# Patient Record
Sex: FEMALE | Race: WHITE | NOT HISPANIC OR LATINO | Employment: UNEMPLOYED | URBAN - METROPOLITAN AREA
[De-identification: names, ages, dates, MRNs, and addresses within clinical notes are randomized per-mention and may not be internally consistent; named-entity substitution may affect disease eponyms.]

---

## 2017-03-23 ENCOUNTER — HOSPITAL ENCOUNTER (EMERGENCY)
Facility: OTHER | Age: 62
Discharge: HOME | End: 2017-03-23
Attending: EMERGENCY MEDICINE
Payer: MEDICAID

## 2017-03-23 VITALS
OXYGEN SATURATION: 100 % | TEMPERATURE: 99 F | WEIGHT: 146 LBS | SYSTOLIC BLOOD PRESSURE: 148 MMHG | RESPIRATION RATE: 18 BRPM | HEART RATE: 73 BPM | DIASTOLIC BLOOD PRESSURE: 60 MMHG

## 2017-03-23 DIAGNOSIS — M85.80 OSTEOPENIA: ICD-10-CM

## 2017-03-23 DIAGNOSIS — M19.012 OSTEOARTHRITIS OF LEFT SHOULDER, UNSPECIFIED OSTEOARTHRITIS TYPE: ICD-10-CM

## 2017-03-23 DIAGNOSIS — M25.512 ACUTE PAIN OF LEFT SHOULDER: Primary | ICD-10-CM

## 2017-03-23 PROCEDURE — 99283 EMERGENCY DEPT VISIT LOW MDM: CPT

## 2017-03-23 RX ORDER — HYDROCODONE BITARTRATE AND ACETAMINOPHEN 5; 325 MG/1; MG/1
1 TABLET ORAL EVERY 6 HOURS PRN
Qty: 12 TABLET | Refills: 0 | Status: SHIPPED | OUTPATIENT
Start: 2017-03-23

## 2017-03-23 RX ORDER — LISINOPRIL 40 MG/1
40 TABLET ORAL DAILY
COMMUNITY

## 2017-03-23 RX ORDER — IBUPROFEN 100 MG/5ML
1000 SUSPENSION, ORAL (FINAL DOSE FORM) ORAL DAILY
COMMUNITY

## 2017-03-23 RX ORDER — METOPROLOL TARTRATE 50 MG/1
50 TABLET ORAL 2 TIMES DAILY
COMMUNITY

## 2017-03-23 RX ORDER — HYDROCHLOROTHIAZIDE 12.5 MG/1
12.5 TABLET ORAL DAILY
COMMUNITY

## 2017-03-23 RX ORDER — AMLODIPINE BESYLATE 10 MG/1
10 TABLET ORAL DAILY
COMMUNITY

## 2017-03-23 NOTE — ED TRIAGE NOTES
"Pt reports fell at work "last May" 2016 injuring left arm pt reports unable to see Doctor due to no insurance / pt reports reported accident to work   "

## 2017-03-23 NOTE — ED NOTES
Shoulder Pain: Patient complaints of left shoulder pain. This is evaluated as a personal injury. The pain is described as sharp and shooting.  The onset of the pain was gradual, starting about a few weeks ago.  The pain occurs continuously and lasts 2 days.  Location is anterior, posterior. Recurrent, 5 episodes of dislocation. Symptoms are aggravated by reaching, pulling. Symptoms are diminished by  rest, heat, medication: ASA, Ibuprofen used but not effective.   Limited activities include: pushing, carrying, throwing. no swelling is reported. 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

## 2017-03-23 NOTE — ED AVS SNAPSHOT
[facility] EMERGENCY DEPARTMENT  [address] [facility] [address]               Lilly P Alyssa   3/23/2017 11:40 AM   ED    Description:  Female : 1955   Department:  [facility] Emergency Department           Your Care was Coordinated By:     Provider Role From To    Alejandrina Henning MD Attending Provider 17 1154 --      Reason for Visit     Arm Pain           Diagnoses this Visit        Comments    Acute pain of left shoulder    -  Primary     Osteopenia         Osteoarthritis of left shoulder, unspecified osteoarthritis type           ED Disposition     ED Disposition Condition Comment    Discharge  Lilly Shah discharge to home/self care.    - Condition at discharge: Stable  - Mode of Discharge: by walking out            To Do List           Follow-up Information     Schedule an appointment as soon as possible for a visit with MARLEN Villegas MD.    Specialty:  Orthopedic Surgery    Why:  for continued pain    Contact information:    [address] [facility] [address]  [phone]         These Medications        Disp Refills Start End    hydrocodone-acetaminophen 5-325mg (NORCO) 5-325 mg per tablet 12 tablet 0 3/23/2017     Take 1 tablet by mouth every 6 (six) hours as needed for Pain. - Oral      [facility]     [facility] -  Assistance  Registered nurses in the [facility] provide clinical advisement, health education, appointment booking, and other advisory services.  Call for this free service at [phone].             Medications           START taking these NEW medications        Refills    hydrocodone-acetaminophen 5-325mg (NORCO) 5-325 mg per tablet 0    Sig: Take 1 tablet by mouth every 6 (six) hours as needed for Pain.    Class: Print    Route: Oral           Verify that the below list of medications is an accurate representation of the medications you are currently taking.  If none reported, the list may be  blank. If incorrect, please contact your healthcare provider. Carry this list with you in case of emergency.           Current Medications     amlodipine (NORVASC) 10 MG tablet Take 10 mg by mouth once daily.    ascorbic acid, vitamin C, (VITAMIN C) 1000 MG tablet Take 1,000 mg by mouth once daily.    hydrochlorothiazide (HYDRODIURIL) 12.5 MG Tab Take 12.5 mg by mouth once daily.    lisinopril (PRINIVIL,ZESTRIL) 40 MG tablet Take 40 mg by mouth once daily.    metoprolol tartrate (LOPRESSOR) 50 MG tablet Take 50 mg by mouth 2 (two) times daily.    hydrocodone-acetaminophen 5-325mg (NORCO) 5-325 mg per tablet Take 1 tablet by mouth every 6 (six) hours as needed for Pain.           Clinical Reference Information           Your Vitals Were     BP Pulse Temp Resp Weight SpO2    148/60 (BP Location: Right arm, Patient Position: Sitting, BP Method: Automatic) 73 99.2 °F (37.3 °C) (Temporal) 18 66.2 kg (146 lb) 100%      Allergies as of 3/23/2017     No Known Allergies      Immunizations Administered on Date of Encounter - 3/23/2017     None      ED Micro, Lab, POCT     None      ED Imaging Orders     Start Ordered       Status Ordering Provider    03/23/17 1250 03/23/17 1249  X-Ray Shoulder 2 or More Views Left  1 time imaging      Final result       Discharge References/Attachments     SHOULDER PAIN, UNCERTAIN CAUSE (ENGLISH)    AC ARTHRITIS (ACROMIOCLAVICULAR ARTHRITIS) (ENGLISH)      [facility] Sign-Up     Activating your [facility] account is as easy as 1-2-3!     1) Visit [email or web address], select Sign Up Now, enter this activation code and your date of birth, then select Next.  [email or web address]  Expires: 5/7/2017  1:31 PM      2) Create a username and password to use when you visit [facility] in the future and select a security question in case you lose your password and select Next.    3) Enter your e-mail address and click Sign Up!    Additional Information  If you have questions, please e-mail [email or web address] or  call [phone] to talk to our [facility] staff. Remember, [facility] is NOT to be used for urgent needs. For medical emergencies, dial 911.          [facility] Emergency Department complies with applicable Federal civil rights laws and does not discriminate on the basis of race, color, national origin, age, disability, or sex.        Language Assistance Services     ATTENTION: Language assistance services are available, free of charge. Please call [phone].      ATENCIÓN: Si habla bouchra, tiene a aguilar disposición servicios gratuitos de asistencia lingüística. Llame al [phone].     CHÚ Ý: N?u b?n nói Ti?ng Vi?t, có các d?ch v? h? tr? ngôn ng? mi?n phí dành cho b?n. G?i s? [phone].

## 2017-03-24 NOTE — ED PROVIDER NOTES
Encounter Date: 3/23/2017       History     Chief Complaint   Patient presents with    Arm Pain     Onset May 2016     Review of patient's allergies indicates:  No Known Allergies  HPI Comments: Ms Shah reports L shoulder pain, worsens with certain reaching movements, unable to fasten bra without pain. sxs began last year after fall at work, have gradually worsened. Pain hurts in top of shoulder, radiating to L lateral upper shoulder. No neck pain.     The history is provided by the patient.     Past Medical History:   Diagnosis Date    Hypertension      Past Surgical History:   Procedure Laterality Date     SECTION       History reviewed. No pertinent family history.  Social History   Substance Use Topics    Smoking status: Former Smoker     Quit date: 2010    Smokeless tobacco: None    Alcohol use No     Review of Systems   Musculoskeletal:        Pain in L shoulder, worsens w reaching and raising arm back and over head.    All other systems reviewed and are negative.      Physical Exam   Initial Vitals   BP Pulse Resp Temp SpO2   17 1133 17 1133 17 1133 17 1133 17 1133   148/60 73 18 99.2 °F (37.3 °C) 100 %     Physical Exam    Nursing note and vitals reviewed.  Constitutional: She appears well-developed and well-nourished. She is not diaphoretic. No distress.   HENT:   Head: Normocephalic and atraumatic.   Neck: Normal range of motion. Neck supple.   Cardiovascular: Normal rate and regular rhythm.   Pulmonary/Chest: Breath sounds normal.   Musculoskeletal: She exhibits no edema or tenderness.   Pain with raising and rotating L shoulder. No deformity or abnl noted.    Neurological: She is alert and oriented to person, place, and time.   Skin: Skin is warm and dry. No rash noted. No erythema.   Psychiatric: She has a normal mood and affect. Her behavior is normal. Thought content normal.         ED Course   Procedures  Labs Reviewed - No data to display           Medical Decision Making:   Clinical Tests:   Radiological Study: Ordered and Reviewed  ED Management:  Discussed xray results, questions answered. Stable for d/c. There is no indication for further emergent interveniton or evalution at this time.                    ED Course     Clinical Impression:   The primary encounter diagnosis was Acute pain of left shoulder. Diagnoses of Osteopenia and Osteoarthritis of left shoulder, unspecified osteoarthritis type were also pertinent to this visit.          Alejandrina Henning MD  03/23/17 5173